# Patient Record
Sex: MALE | Race: WHITE | NOT HISPANIC OR LATINO | Employment: OTHER | URBAN - NONMETROPOLITAN AREA
[De-identification: names, ages, dates, MRNs, and addresses within clinical notes are randomized per-mention and may not be internally consistent; named-entity substitution may affect disease eponyms.]

---

## 2020-01-20 ENCOUNTER — TRANSCRIBE ORDERS (OUTPATIENT)
Dept: ADMINISTRATIVE | Facility: HOSPITAL | Age: 55
End: 2020-01-20

## 2020-01-20 ENCOUNTER — HOSPITAL ENCOUNTER (OUTPATIENT)
Dept: GENERAL RADIOLOGY | Facility: HOSPITAL | Age: 55
Discharge: HOME | End: 2020-01-20
Admitting: ANESTHESIOLOGY

## 2020-01-20 DIAGNOSIS — M54.50 LOW BACK PAIN, UNSPECIFIED BACK PAIN LATERALITY, UNSPECIFIED CHRONICITY, UNSPECIFIED WHETHER SCIATICA PRESENT: ICD-10-CM

## 2020-01-20 DIAGNOSIS — M54.50 LOW BACK PAIN, UNSPECIFIED BACK PAIN LATERALITY, UNSPECIFIED CHRONICITY, UNSPECIFIED WHETHER SCIATICA PRESENT: Primary | ICD-10-CM

## 2020-01-20 PROCEDURE — 72110 X-RAY EXAM L-2 SPINE 4/>VWS: CPT

## 2020-01-20 PROCEDURE — 72110 X-RAY EXAM L-2 SPINE 4/>VWS: CPT | Performed by: RADIOLOGY

## 2020-02-18 ENCOUNTER — PREP FOR SURGERY (OUTPATIENT)
Dept: OTHER | Facility: HOSPITAL | Age: 55
End: 2020-02-18

## 2020-02-18 DIAGNOSIS — M47.816 LUMBAR SPONDYLOSIS: Primary | ICD-10-CM

## 2020-02-18 NOTE — H&P
[id number]  Clay Bullock  male  1965  Bhavik Keene, DO  2/18/2020  PATIENT NAME: Clay Bullock      BP: ()/()   Arterial Line BP: ()/()     No past medical history on file.    No past surgical history on file.    Social History     Socioeconomic History   • Marital status: Single     Spouse name: Not on file   • Number of children: Not on file   • Years of education: Not on file   • Highest education level: Not on file       No family history on file.      (Not in a hospital admission)      Review of Systems/Physical Exam:   Within Normal Limits Abnormal   Mental Status [x]    []     Head, Neck, and Throat [x]   []     Chest/Lungs [x]   []     Cardiovascular [x]   []     Abdomen [x]   []     Extremities [x]   []     Neurologic [x]   []     Other         There were no vitals filed for this visit.    Review of Systems - as previously noted      Diagnosis: M47.816    Plan: LUMBAR MEDIAL BRANCH BLOCK L3/4 L4/L5 2 level Bilateral (Bilateral)       STATEMENT AS TO REASON OF PLANNED OPERATION:  [x]   H&P revealed no contraindication to planned surgery. (Check if correct).    [x]   Labs (if ordered) have been reviewed and revealed no contraindications to planned surgery. (Check if correct).      Bhavik Keene, DO  2/18/2020

## 2020-02-26 ENCOUNTER — HOSPITAL ENCOUNTER (OUTPATIENT)
Facility: HOSPITAL | Age: 55
Setting detail: HOSPITAL OUTPATIENT SURGERY
Discharge: HOME | End: 2020-02-26
Attending: ANESTHESIOLOGY | Admitting: ANESTHESIOLOGY

## 2020-03-06 ENCOUNTER — PREP FOR SURGERY (OUTPATIENT)
Dept: OTHER | Facility: HOSPITAL | Age: 55
End: 2020-03-06

## 2020-03-06 DIAGNOSIS — M47.816 LUMBAR SPONDYLOSIS: Primary | ICD-10-CM

## 2020-03-06 NOTE — H&P
[id number]  Clay Bullock  male  1965  Bhavik Keene, DO  3/6/2020  PATIENT NAME: Clay Bullock           Past Medical History:   Diagnosis Date   • Arthritis    • Diabetes mellitus (CMS/HCC)    • Elevated cholesterol    • GERD (gastroesophageal reflux disease)    • Hypertension    • Sleep apnea        No past surgical history on file.    Social History     Socioeconomic History   • Marital status: Single     Spouse name: Not on file   • Number of children: Not on file   • Years of education: Not on file   • Highest education level: Not on file   Tobacco Use   • Smoking status: Former Smoker   • Smokeless tobacco: Current User     Types: Snuff   • Tobacco comment: stopped 30 years ago   Substance and Sexual Activity   • Alcohol use: Yes     Comment: occasional   • Drug use: Not Currently   • Sexual activity: Defer       No family history on file.      (Not in a hospital admission)      Review of Systems/Physical Exam:   Within Normal Limits Abnormal   Mental Status [x]    []     Head, Neck, and Throat [x]   []     Chest/Lungs [x]   []     Cardiovascular [x]   []     Abdomen [x]   []     Extremities [x]   []     Neurologic [x]   []     Other         There were no vitals filed for this visit.    Review of Systems - as previously noted      Diagnosis: M47.816    Plan: LUMBAR MEDIAL BRANCH BLOCK Bilateral L3/4 L5/S1 (Bilateral)       STATEMENT AS TO REASON OF PLANNED OPERATION:  [x]   H&P revealed no contraindication to planned surgery. (Check if correct).    [x]   Labs (if ordered) have been reviewed and revealed no contraindications to planned surgery. (Check if correct).      Bhavik CURRY Rosalinodon, DO  3/6/2020

## 2020-03-11 ENCOUNTER — HOSPITAL ENCOUNTER (OUTPATIENT)
Facility: HOSPITAL | Age: 55
Setting detail: HOSPITAL OUTPATIENT SURGERY
Discharge: HOME | End: 2020-03-11
Attending: ANESTHESIOLOGY | Admitting: ANESTHESIOLOGY

## 2020-03-11 ENCOUNTER — APPOINTMENT (OUTPATIENT)
Dept: GENERAL RADIOLOGY | Facility: HOSPITAL | Age: 55
End: 2020-03-11

## 2020-03-11 VITALS
BODY MASS INDEX: 57.97 KG/M2 | WEIGHT: 315 LBS | HEART RATE: 78 BPM | SYSTOLIC BLOOD PRESSURE: 143 MMHG | HEIGHT: 62 IN | DIASTOLIC BLOOD PRESSURE: 84 MMHG | RESPIRATION RATE: 18 BRPM | OXYGEN SATURATION: 93 % | TEMPERATURE: 97.1 F

## 2020-03-11 PROCEDURE — 76000 FLUOROSCOPY <1 HR PHYS/QHP: CPT | Performed by: RADIOLOGY

## 2020-03-11 PROCEDURE — 25010000002 METHYLPREDNISOLONE PER 80 MG: Performed by: ANESTHESIOLOGY

## 2020-03-11 PROCEDURE — 76000 FLUOROSCOPY <1 HR PHYS/QHP: CPT

## 2020-03-11 RX ORDER — ASPIRIN 81 MG/1
81 TABLET, CHEWABLE ORAL DAILY
COMMUNITY

## 2020-03-11 RX ORDER — BUPIVACAINE HYDROCHLORIDE 5 MG/ML
INJECTION, SOLUTION PERINEURAL AS NEEDED
Status: DISCONTINUED | OUTPATIENT
Start: 2020-03-11 | End: 2020-03-11 | Stop reason: HOSPADM

## 2020-03-11 RX ORDER — METHYLPREDNISOLONE ACETATE 80 MG/ML
INJECTION, SUSPENSION INTRA-ARTICULAR; INTRALESIONAL; INTRAMUSCULAR; SOFT TISSUE AS NEEDED
Status: DISCONTINUED | OUTPATIENT
Start: 2020-03-11 | End: 2020-03-11 | Stop reason: HOSPADM

## 2020-03-11 RX ORDER — ERGOCALCIFEROL 1.25 MG/1
50000 CAPSULE ORAL WEEKLY
COMMUNITY

## 2020-03-11 RX ORDER — PIOGLITAZONEHYDROCHLORIDE 30 MG/1
30 TABLET ORAL DAILY
COMMUNITY

## 2020-03-11 RX ORDER — PANTOPRAZOLE SODIUM 40 MG/1
40 TABLET, DELAYED RELEASE ORAL DAILY
COMMUNITY

## 2020-03-11 RX ORDER — CHLORAL HYDRATE 500 MG
1000 CAPSULE ORAL 2 TIMES DAILY WITH MEALS
COMMUNITY

## 2020-03-11 RX ORDER — BACLOFEN 20 MG/1
20 TABLET ORAL 3 TIMES DAILY
COMMUNITY

## 2020-03-11 RX ORDER — VERAPAMIL HYDROCHLORIDE 120 MG/1
120 CAPSULE, EXTENDED RELEASE ORAL NIGHTLY
COMMUNITY

## 2020-03-11 RX ORDER — ATORVASTATIN CALCIUM 80 MG/1
80 TABLET, FILM COATED ORAL DAILY
COMMUNITY

## 2020-03-11 RX ORDER — LISINOPRIL 20 MG/1
20 TABLET ORAL DAILY
COMMUNITY

## 2020-03-11 NOTE — OP NOTE
Informed consent was obtained after a discussion of risks, benefits and alternatives to the procedure. Patient agreed to proceed by signing the procedure consent. The patient was placed in a prone position on the fluoroscopy table. Blood pressure, heart rate, and pulse oximetry were monitored throughout the procedure.     Skin overlying the lumbar region was cleansed with ethyl alcohol and chlorahexidine solution.  Next using A/P fluoroscopic views a 25-gauge by 3 1/2 inch spinal needle was advanced to make contact with notch of the sacral ala.  Following this, under fluoroscopic views needles were advanced into the junction of the superior articular process and transverse process at the L4 and L5 levels on left sides and positioned with the needle tips just posterior to a line connecting the neuroforaminal space.  Each level was then injected with a mixture of Methylprednisolone and 1cc of 0.5% Bupivicaine.  The needles were then removed intact.  The procedure was then repeated on the right in a similar manner with the same injectate.  Procedure was repeated on the contralateral side in the same manner. 80mg total of methylprednisolone were used.       The patient was monitored for adverse allergic, hemodynamic, or neurologic symptoms after the procedure. The patient tolerated the procedure well and there were no apparent complications. Vital signs remained stable throughout the procedure. The patient was taken to the recovery area where written discharge instructions for the procedure were given. The patient was discharged home.    NOTE: Universal Sterile Protocol was observed throughout the entire procedure and this patient has been educated prior to the performance of this procedure.    2 level LMBB L4/5, L5/S1      CPT   44945,50  64975,50        Bhavik Keene DO  03/11/20

## 2020-09-10 ENCOUNTER — TRANSCRIBE ORDERS (OUTPATIENT)
Dept: ADMINISTRATIVE | Facility: HOSPITAL | Age: 55
End: 2020-09-10

## 2020-09-10 ENCOUNTER — PREP FOR SURGERY (OUTPATIENT)
Dept: OTHER | Facility: HOSPITAL | Age: 55
End: 2020-09-10

## 2020-09-10 DIAGNOSIS — Z01.818 PREOP EXAMINATION: Primary | ICD-10-CM

## 2020-09-10 DIAGNOSIS — M47.817 LUMBOSACRAL SPONDYLOSIS WITHOUT MYELOPATHY: Primary | ICD-10-CM

## 2020-09-11 ENCOUNTER — LAB (OUTPATIENT)
Dept: LAB | Facility: HOSPITAL | Age: 55
End: 2020-09-11

## 2020-09-11 DIAGNOSIS — Z01.818 PREOP EXAMINATION: ICD-10-CM

## 2020-09-11 PROBLEM — M47.817 LUMBOSACRAL SPONDYLOSIS WITHOUT MYELOPATHY: Status: ACTIVE | Noted: 2020-09-11

## 2020-09-11 PROCEDURE — C9803 HOPD COVID-19 SPEC COLLECT: HCPCS

## 2020-09-11 PROCEDURE — U0004 COV-19 TEST NON-CDC HGH THRU: HCPCS

## 2020-09-11 NOTE — H&P
[facility]         History and Physical    MRN: [id number]  Patient Name: Clay Bullock  : 1965  Gender: male   Physician: Bhavik Keene DO  Date of Service: 9/10/2020    HPI: LOW BACK PAIN    History:    Past Medical History:   Diagnosis Date   • Arthritis    • Diabetes mellitus (CMS/HCC)    • Elevated cholesterol    • GERD (gastroesophageal reflux disease)    • Hypertension    • Sleep apnea        Past Surgical History:   Procedure Laterality Date   • COLONOSCOPY     • MEDIAL BRANCH BLOCK Bilateral 3/11/2020    Procedure: LUMBAR MEDIAL BRANCH BLOCK Bilateral L3/4 L5/S1;  Surgeon: Bhavik Keene DO;  Location: [facility];  Service: Pain Management;  Laterality: Bilateral;       Social History     Socioeconomic History   • Marital status: Single     Spouse name: Not on file   • Number of children: Not on file   • Years of education: Not on file   • Highest education level: Not on file   Tobacco Use   • Smoking status: Former Smoker   • Smokeless tobacco: Current User     Types: Snuff   • Tobacco comment: stopped 30 years ago   Substance and Sexual Activity   • Alcohol use: Yes     Comment: occasional   • Drug use: Not Currently   • Sexual activity: Defer       No family history on file.    Prior to Admission Medications:    (Not in a hospital admission)    Allergies:  No Known Allergies     Vitals: BP: ()/()   Arterial Line BP: ()/()      Review of Systems:   Within Normal Limits Abnormal   HEENT [x]    []     Cardiovascular [x]   []     Gastrointestinal [x]   []     Genitourinary [x]   []     Neurologic [x]   []     Pulmonary [x]   []       Physical Exam:   Within Normal Limits Abnormal   Head, Neck, and Throat [x]    []     Heart [x]   []     Lungs [x]   []     Abdomen [x]   []     Extremities [x]   []     Mental Status [x]    []        Diagnosis: M47.817    Plan: RADIOFREQUENCY ABLATION LUMBAR 2 LEVEL BILATERAL (Bilateral)     [x]   H&P revealed no contraindication to planned surgery.      [x]   Labs (if ordered) have been reviewed and revealed no contraindications to planned surgery.    RISKS, BENEFITS, and ALTERNATIVES discussed with patient who would like to proceed.     Bhavik Keene, DO  9/10/2020

## 2020-09-11 NOTE — H&P (VIEW-ONLY)
[facility]         History and Physical    MRN: [id number]  Patient Name: Clay Bullock  : 1965  Gender: male   Physician: Bhavik Keene DO  Date of Service: 9/10/2020    HPI: LOW BACK PAIN    History:    Past Medical History:   Diagnosis Date   • Arthritis    • Diabetes mellitus (CMS/HCC)    • Elevated cholesterol    • GERD (gastroesophageal reflux disease)    • Hypertension    • Sleep apnea        Past Surgical History:   Procedure Laterality Date   • COLONOSCOPY     • MEDIAL BRANCH BLOCK Bilateral 3/11/2020    Procedure: LUMBAR MEDIAL BRANCH BLOCK Bilateral L3/4 L5/S1;  Surgeon: Bhavik Keene DO;  Location: [facility];  Service: Pain Management;  Laterality: Bilateral;       Social History     Socioeconomic History   • Marital status: Single     Spouse name: Not on file   • Number of children: Not on file   • Years of education: Not on file   • Highest education level: Not on file   Tobacco Use   • Smoking status: Former Smoker   • Smokeless tobacco: Current User     Types: Snuff   • Tobacco comment: stopped 30 years ago   Substance and Sexual Activity   • Alcohol use: Yes     Comment: occasional   • Drug use: Not Currently   • Sexual activity: Defer       No family history on file.    Prior to Admission Medications:    (Not in a hospital admission)    Allergies:  No Known Allergies     Vitals: BP: ()/()   Arterial Line BP: ()/()      Review of Systems:   Within Normal Limits Abnormal   HEENT [x]    []     Cardiovascular [x]   []     Gastrointestinal [x]   []     Genitourinary [x]   []     Neurologic [x]   []     Pulmonary [x]   []       Physical Exam:   Within Normal Limits Abnormal   Head, Neck, and Throat [x]    []     Heart [x]   []     Lungs [x]   []     Abdomen [x]   []     Extremities [x]   []     Mental Status [x]    []        Diagnosis: M47.817    Plan: RADIOFREQUENCY ABLATION LUMBAR 2 LEVEL BILATERAL (Bilateral)     [x]   H&P revealed no contraindication to planned surgery.      [x]   Labs (if ordered) have been reviewed and revealed no contraindications to planned surgery.    RISKS, BENEFITS, and ALTERNATIVES discussed with patient who would like to proceed.     Bhavik Keene, DO  9/10/2020

## 2020-09-12 LAB — SARS-COV-2 RNA NOSE QL NAA+PROBE: NOT DETECTED

## 2020-09-14 RX ORDER — HYDROCODONE BITARTRATE AND ACETAMINOPHEN 7.5; 325 MG/1; MG/1
1 TABLET ORAL EVERY 6 HOURS PRN
COMMUNITY

## 2020-09-15 ENCOUNTER — HOSPITAL ENCOUNTER (OUTPATIENT)
Facility: HOSPITAL | Age: 55
Setting detail: HOSPITAL OUTPATIENT SURGERY
Discharge: HOME | End: 2020-09-15
Attending: ANESTHESIOLOGY | Admitting: ANESTHESIOLOGY

## 2020-09-15 ENCOUNTER — ANESTHESIA EVENT (OUTPATIENT)
Dept: PERIOP | Facility: HOSPITAL | Age: 55
End: 2020-09-15

## 2020-09-15 ENCOUNTER — ANESTHESIA (OUTPATIENT)
Dept: PERIOP | Facility: HOSPITAL | Age: 55
End: 2020-09-15

## 2020-09-15 ENCOUNTER — APPOINTMENT (OUTPATIENT)
Dept: GENERAL RADIOLOGY | Facility: HOSPITAL | Age: 55
End: 2020-09-15

## 2020-09-15 VITALS
HEART RATE: 76 BPM | HEIGHT: 62 IN | BODY MASS INDEX: 55.21 KG/M2 | SYSTOLIC BLOOD PRESSURE: 142 MMHG | TEMPERATURE: 98.6 F | OXYGEN SATURATION: 99 % | DIASTOLIC BLOOD PRESSURE: 72 MMHG | WEIGHT: 300 LBS | RESPIRATION RATE: 18 BRPM

## 2020-09-15 LAB — GLUCOSE BLDC GLUCOMTR-MCNC: 113 MG/DL (ref 70–130)

## 2020-09-15 PROCEDURE — 76000 FLUOROSCOPY <1 HR PHYS/QHP: CPT

## 2020-09-15 PROCEDURE — 25010000002 METHYLPREDNISOLONE PER 80 MG: Performed by: ANESTHESIOLOGY

## 2020-09-15 PROCEDURE — 25010000002 FENTANYL CITRATE (PF) 100 MCG/2ML SOLUTION: Performed by: NURSE ANESTHETIST, CERTIFIED REGISTERED

## 2020-09-15 PROCEDURE — 25010000002 MIDAZOLAM PER 1MG: Performed by: NURSE ANESTHETIST, CERTIFIED REGISTERED

## 2020-09-15 PROCEDURE — 82962 GLUCOSE BLOOD TEST: CPT

## 2020-09-15 RX ORDER — SODIUM CHLORIDE, SODIUM LACTATE, POTASSIUM CHLORIDE, CALCIUM CHLORIDE 600; 310; 30; 20 MG/100ML; MG/100ML; MG/100ML; MG/100ML
1000 INJECTION, SOLUTION INTRAVENOUS CONTINUOUS
Status: DISCONTINUED | OUTPATIENT
Start: 2020-09-15 | End: 2020-09-15 | Stop reason: HOSPADM

## 2020-09-15 RX ORDER — LIDOCAINE HYDROCHLORIDE 20 MG/ML
INJECTION, SOLUTION INFILTRATION; PERINEURAL AS NEEDED
Status: DISCONTINUED | OUTPATIENT
Start: 2020-09-15 | End: 2020-09-15 | Stop reason: HOSPADM

## 2020-09-15 RX ORDER — METHYLPREDNISOLONE ACETATE 80 MG/ML
INJECTION, SUSPENSION INTRA-ARTICULAR; INTRALESIONAL; INTRAMUSCULAR; SOFT TISSUE AS NEEDED
Status: DISCONTINUED | OUTPATIENT
Start: 2020-09-15 | End: 2020-09-15 | Stop reason: HOSPADM

## 2020-09-15 RX ORDER — FENTANYL CITRATE 50 UG/ML
INJECTION, SOLUTION INTRAMUSCULAR; INTRAVENOUS AS NEEDED
Status: DISCONTINUED | OUTPATIENT
Start: 2020-09-15 | End: 2020-09-15 | Stop reason: SURG

## 2020-09-15 RX ORDER — BUPIVACAINE HYDROCHLORIDE 5 MG/ML
INJECTION, SOLUTION EPIDURAL; INTRACAUDAL AS NEEDED
Status: DISCONTINUED | OUTPATIENT
Start: 2020-09-15 | End: 2020-09-15 | Stop reason: HOSPADM

## 2020-09-15 RX ORDER — MIDAZOLAM HYDROCHLORIDE 2 MG/2ML
INJECTION, SOLUTION INTRAMUSCULAR; INTRAVENOUS AS NEEDED
Status: DISCONTINUED | OUTPATIENT
Start: 2020-09-15 | End: 2020-09-15 | Stop reason: SURG

## 2020-09-15 RX ADMIN — FENTANYL CITRATE 50 MCG: 50 INJECTION INTRAMUSCULAR; INTRAVENOUS at 10:12

## 2020-09-15 RX ADMIN — SODIUM CHLORIDE, POTASSIUM CHLORIDE, SODIUM LACTATE AND CALCIUM CHLORIDE: 600; 310; 30; 20 INJECTION, SOLUTION INTRAVENOUS at 09:55

## 2020-09-15 RX ADMIN — FENTANYL CITRATE 50 MCG: 50 INJECTION INTRAMUSCULAR; INTRAVENOUS at 10:07

## 2020-09-15 RX ADMIN — MIDAZOLAM HYDROCHLORIDE 2 MG: 1 INJECTION, SOLUTION INTRAMUSCULAR; INTRAVENOUS at 10:07

## 2020-09-15 NOTE — OP NOTE
The risk and benefits of the procedure were discussed with patient, as well as alternative therapy options. Patient verbalized an understood and agreed to proceed.     The patient was brought into the fluoroscopy suite and placed in a prone position on the procedure table. The patient's blood pressure, heart rate, and pulse oximetry were monitored throughout the procedure. The skin overlying the lumbar region was cleansed with ethyl alcohol followed by a chlorohexidine solution and allowed to dry. Next, sterile towels were placed around the sterile field, and sterile technique was maintained throughout the procedure.  The skin and soft tissue at the injection sites were then infiltrated with 4 mL of 1% Lidocaine for local anesthesia.  Using A/P fluoroscopic views a 22 gauge curved SMK needle was advanced to make contact with notch of the sacral ala.  Following this, using oblique fluoroscopic views SMK needles were advanced into the junction of the superior articular process and transverse process at the L4 and L5 levels on the left side and positioned with the needle tips just posterior to a line connecting the neuroforaminal space.  Motor testing was then conducted at 2 Hz up to 2.5 volts.  There was no motor response elicited at any of the levels. Following this each level was injected with 1 mL of a mixture of 50/50 of 1% Lidocaine and 0.5% bupivacaine mixed with Depomed. After local anesthetic pretreatment, radiofrequency lesioning was performed at 85 degrees Celsius for 90 seconds at each level. The needles were removed intact. This was then performed on the other side .A total of 40mg methylprednisolone was used.    The patient was taken to the recovery area in stable condition.  There were no complications of the procedure noted.    2 level octavio RFA lumbar    Bhavik Keene DO  09/15/20

## 2020-09-15 NOTE — DISCHARGE INSTRUCTIONS
You have received an injection of local anesthetic and/or corticosteroids. The local anesthetic effect typically last 2-6 hours. It may take 3-10 days for the corticosteroids to reach optimal effect.    Please pay close attention to the following information/instructions:    You may not drive for 12 hours after your injection.    It is common to experience mild soreness at the injection site(s) for 24-48 hours. Ice is the best remedy. You may apply ice for 20 minutes at a time several times a day as needed.    Avoid heat to the injection area for 72 hours. No hot packs, saunas, or steam rooms during this time. A regular shower is OK.    You may restart your regular medication regimen, including pain medications, anti-inflammatory. Restart your blood thinner as directed by your physician.    Please remove the sterile dressing/band-aid tonight or tomorrow morning. Do not leave it on after you have taken a shower or gotten it wet.    Corticosteroid side effects can occur after this injection, but they usually resolve   after several days. These side effects can include flushing, hot flashes, mild palpitations, insomnia, water retention, feeling anxious/restless, or headaches.    While it is extremely rare to get an infection after a spinal procedure, please call the office if you develop any signs of infection. These signs include fevers/chills, severely increased pain, redness at the injections site, or any drainage from the injection site.    Remember that the corticosteroid benefit (long-term pain relief) from an injection can take as long as 10 days to occur. You may have a period of slightly increased pain after your injection before the cortisone takes effect. In some cases the local anesthetic will provide the majority of your injection. Please note this relief as well.     You may resume all of your normal daily activities 24 hours after your injection.    It is OK to restart your exercise or physical therapy  program as soon as you feel comfortable doing so.    Please call our office if you do not know or have your follow-up appointment date.    Please note your pain throughout your post-injection period.    No pushing, pulling, tugging,  heavy lifting, or strenuous activity.  No major decision making, driving, or drinking alcoholic beverages for 24 hours. ( due to the medications you have  received)  Always use good hand hygiene/washing techniques.  NO driving while taking pain medications.    * if you have an incision:  Check your incision area every day for signs of infection.   Check for:  * more redness, swelling, or pain  *more fluid or blood  *warmth  *pus or bad smell    To assist you in voiding:  Drink plenty of fluids  Listen to running water while attempting to void.    If you are unable to urinate and you have an uncomfortable urge to void or it has been   6 hours since you were discharged, return to the Emergency Room

## 2020-09-15 NOTE — ANESTHESIA POSTPROCEDURE EVALUATION
Patient: Clay Bullock    Procedure Summary     Date: 09/15/20 Room / Location:  [facility]    Anesthesia Start: 1003 Anesthesia Stop: 1032    Procedure: RADIOFREQUENCY ABLATION LUMBAR 2 LEVEL BILATERAL (Bilateral ) Diagnosis:       Lumbosacral spondylosis without myelopathy      (Lumbosacral spondylosis without myelopathy [M47.817])    Surgeon: Bhavik Keene DO Provider: Lynsey Ribera CRNA    Anesthesia Type: MAC ASA Status: 3          Anesthesia Type: MAC    Vitals  Vitals Value Taken Time   /72 09/15/20 1100   Temp 98.6 °F (37 °C) 09/15/20 1034   Pulse 76 09/15/20 1100   Resp 18 09/15/20 1100   SpO2 99 % 09/15/20 1100           Post Anesthesia Care and Evaluation    Patient location during evaluation: PHASE II  Patient participation: complete - patient participated  Level of consciousness: awake and alert  Pain score: 0  Pain management: satisfactory to patient  Airway patency: patent  Anesthetic complications: No anesthetic complications  PONV Status: none  Cardiovascular status: acceptable and stable  Respiratory status: acceptable  Hydration status: acceptable

## 2020-09-15 NOTE — ANESTHESIA PREPROCEDURE EVALUATION
Anesthesia Evaluation     Patient summary reviewed and Nursing notes reviewed   no history of anesthetic complications:  NPO Solid Status: > 8 hours  NPO Liquid Status: > 8 hours           Airway   Mallampati: II  TM distance: >3 FB  Neck ROM: full  No difficulty expected  Dental - normal exam     Pulmonary - normal exam   (+) a smoker Former, sleep apnea,   Cardiovascular - normal exam  Exercise tolerance: good (4-7 METS)    (+) hypertension well controlled 2 medications or greater, hyperlipidemia,       Neuro/Psych  GI/Hepatic/Renal/Endo    (+)  GERD well controlled,  diabetes mellitus type 2 well controlled,     Musculoskeletal     Abdominal    Substance History   (+) alcohol use, drug use     OB/GYN          Other   arthritis,                      Anesthesia Plan    ASA 3     MAC   (Risks and benefits discussed including risk of aspiration, recall and dental damage. All patient questions answered. Will continue with POC.)  intravenous induction     Anesthetic plan, all risks, benefits, and alternatives have been provided, discussed and informed consent has been obtained with: patient.    Plan discussed with CRNA.

## (undated) DEVICE — NDL HYPO ECLPS SFTY 25G 1 1/2IN

## (undated) DEVICE — BNDG ADHS PLSTC 1X3IN LF

## (undated) DEVICE — NDL SPINE 25G 31/2IN BLU

## (undated) DEVICE — APPL CHG2PCT ALC70PCT 3ML ORNG

## (undated) DEVICE — BNDG ADHS CURAD FLX/FABRC 1X3IN STRL LF

## (undated) DEVICE — GLV SURG SENSICARE W/ALOE PF LF 8 STRL

## (undated) DEVICE — APPL CHLORAPREP HI/LITE TINTED 3ML ORNG

## (undated) DEVICE — APPL CHLORAPREP HI/LITE TINTED 10.5ML ORNG

## (undated) DEVICE — SYR LL TP 10ML STRL

## (undated) DEVICE — Device

## (undated) DEVICE — SYR LL 3CC

## (undated) DEVICE — NDL BLNT 18G 1 1/2IN

## (undated) DEVICE — SYR LUERLOK 5CC

## (undated) DEVICE — PAD GRND RF ABL DISP

## (undated) DEVICE — GLV SURG SENSICARE MICRO PF LF 8 STRL

## (undated) DEVICE — NDL HYPO ECLPS SFTY 18G 1 1/2IN

## (undated) DEVICE — ELECTRD RF ABL 10CM DISP